# Patient Record
Sex: FEMALE | Race: WHITE | NOT HISPANIC OR LATINO | URBAN - METROPOLITAN AREA
[De-identification: names, ages, dates, MRNs, and addresses within clinical notes are randomized per-mention and may not be internally consistent; named-entity substitution may affect disease eponyms.]

---

## 2017-01-31 ENCOUNTER — COMMUNICATION - HEALTHEAST (OUTPATIENT)
Dept: FAMILY MEDICINE | Facility: CLINIC | Age: 47
End: 2017-01-31

## 2017-06-17 ENCOUNTER — HEALTH MAINTENANCE LETTER (OUTPATIENT)
Age: 47
End: 2017-06-17

## 2018-02-08 ENCOUNTER — HOSPITAL ENCOUNTER (EMERGENCY)
Facility: CLINIC | Age: 48
Discharge: HOME | End: 2018-02-08
Attending: PHYSICIAN ASSISTANT | Admitting: PHYSICIAN ASSISTANT
Payer: COMMERCIAL

## 2018-02-08 VITALS
DIASTOLIC BLOOD PRESSURE: 89 MMHG | HEIGHT: 68 IN | HEART RATE: 85 BPM | RESPIRATION RATE: 16 BRPM | SYSTOLIC BLOOD PRESSURE: 131 MMHG | TEMPERATURE: 97.7 F | OXYGEN SATURATION: 98 %

## 2018-02-08 DIAGNOSIS — M25.511 RIGHT SHOULDER PAIN, UNSPECIFIED CHRONICITY: ICD-10-CM

## 2018-02-08 PROCEDURE — 25000128 H RX IP 250 OP 636: Performed by: PHYSICIAN ASSISTANT

## 2018-02-08 PROCEDURE — 99202 OFFICE O/P NEW SF 15 MIN: CPT | Performed by: PHYSICIAN ASSISTANT

## 2018-02-08 PROCEDURE — 96372 THER/PROPH/DIAG INJ SC/IM: CPT

## 2018-02-08 PROCEDURE — G0463 HOSPITAL OUTPT CLINIC VISIT: HCPCS | Mod: 25

## 2018-02-08 RX ORDER — KETOROLAC TROMETHAMINE 10 MG/1
10 TABLET, FILM COATED ORAL EVERY 6 HOURS PRN
Qty: 20 TABLET | Refills: 0 | Status: SHIPPED | OUTPATIENT
Start: 2018-02-08 | End: 2018-02-13

## 2018-02-08 RX ORDER — KETOROLAC TROMETHAMINE 30 MG/ML
60 INJECTION, SOLUTION INTRAMUSCULAR; INTRAVENOUS ONCE
Status: COMPLETED | OUTPATIENT
Start: 2018-02-08 | End: 2018-02-08

## 2018-02-08 RX ADMIN — KETOROLAC TROMETHAMINE 60 MG: 30 INJECTION, SOLUTION INTRAMUSCULAR at 20:05

## 2018-02-08 NOTE — ED AVS SNAPSHOT
[facility] Emergency Department    [address] [address]    Phone:  [phone]    Fax:  [phone]                                       Sophy Tran   MRN: [id number]    Department:  [facility] Emergency Department   Date of Visit:  2/8/2018           Patient Information     Date Of Birth          1970        Your diagnoses for this visit were:     Right shoulder pain, unspecified chronicity        You were seen by George Badillo PA-C.        Discharge Instructions       Please give [facility] in [place] a call to make an appointment.  [phone]    [facility] [place]  [address] [place] [address]    Discharge References/Attachments     SHOULDER PAIN, UNCERTAIN CAUSE (ENGLISH)      24 Hour Appointment Hotline       To make an appointment at any [facility] clinic, call [phone] ([phone]). If you don't have a family doctor or clinic, we will help you find one. [facility] clinics are conveniently located to serve the needs of you and your family.             Review of your medicines      START taking        Dose / Directions Last dose taken    ketorolac 10 MG tablet   Commonly known as:  TORADOL   Dose:  10 mg   Quantity:  20 tablet        Take 1 tablet (10 mg) by mouth every 6 hours as needed for moderate pain   Refills:  0          Our records show that you are taking the medicines listed below. If these are incorrect, please call your family doctor or clinic.        Dose / Directions Last dose taken    IRON SUPPLEMENT PO        Refills:  0        MULTI VITAMIN PO        Refills:  0        PROPRANOLOL HCL PO        Refills:  0        VITAMIN C PO        Refills:  0        VITAMIN D (CHOLECALCIFEROL) PO        Take by mouth daily   Refills:  0                Prescriptions were sent or printed at these locations (1 Prescription)                   [facility] [place], [place] - [address]   [address] [place]  "[place] [address]    Telephone:  [phone]   Fax:  [phone]   Hours:                  E-Prescribed (1 of 1)         ketorolac (TORADOL) 10 MG tablet                Orders Needing Specimen Collection     None      Pending Results     No orders found from 2018 to 2018.            Pending Culture Results     No orders found from 2018 to 2018.            Pending Results Instructions     If you had any lab results that were not finalized at the time of your Discharge, you can call the ED Lab Result RN at [phone]. You will be contacted by this team for any positive Lab results or changes in treatment. The nurses are available 7 days a week from 10A to 6:30P.  You can leave a message 24 hours per day and they will return your call.        Test Results From Your Hospital Stay               Thank you for choosing [facility]       Thank you for choosing [facility] for your care. Our goal is always to provide you with excellent care. Hearing back from our patients is one way we can continue to improve our services. Please take a few minutes to complete the written survey that you may receive in the mail after you visit with us. Thank you!        [employer] Information     [employer] lets you send messages to your doctor, view your test results, renew your prescriptions, schedule appointments and more. To sign up, go to [email or web address] . Click on \"Log in\" on the left side of the screen, which will take you to the Welcome page. Then click on \"Sign up Now\" on the right side of the page.     You will be asked to enter the access code listed below, as well as some personal information. Please follow the directions to create your username and password.     Your access code is: [email or web address]  Expires: 2018  8:00 PM     Your access code will  in 90 days. If you need help or a new code, please call your [facility] clinic or [phone].        Care EveryWhere ID     This is your Care EveryWhere " ID. This could be used by other organizations to access your [place] medical records  [id number]        Equal Access to Services     OFELIA MARTIN : Vicente Pineda, cheryl richard, gela auguste. So [facility] [phone].    ATENCIÓN: Si habla español, tiene a zambrano disposición servicios gratuitos de asistencia lingüística. Llame al [phone].    We comply with applicable federal civil rights laws and [place] laws. We do not discriminate on the basis of race, color, national origin, age, disability, sex, sexual orientation, or gender identity.            After Visit Summary       This is your record. Keep this with you and show to your community pharmacist(s) and doctor(s) at your next visit.

## 2018-02-08 NOTE — ED AVS SNAPSHOT
[facility] Emergency Department    [address] [address]    Phone:  [phone]    Fax:  [phone]                                       Sophy Tran   MRN: [id number]    Department:  [facility] Emergency Department   Date of Visit:  2/8/2018           After Visit Summary Signature Page     I have received my discharge instructions, and my questions have been answered. I have discussed any challenges I see with this plan with the nurse or doctor.    ..........................................................................................................................................  Patient/Patient Representative Signature      ..........................................................................................................................................  Patient Representative Print Name and Relationship to Patient    ..................................................               ................................................  Date                                            Time    ..........................................................................................................................................  Reviewed by Signature/Title    ...................................................              ..............................................  Date                                                            Time

## 2018-02-09 NOTE — ED NOTES
Patient here for pain in the R shoulder - NKI - worsening over the past 2 days. Patient presents ambualtory to the urgent care.

## 2018-02-09 NOTE — DISCHARGE INSTRUCTIONS
Please give [facility] in [place] a call to make an appointment.  [phone]    [facility] [place]  [address] [place] [address]

## 2018-02-09 NOTE — ED PROVIDER NOTES
"Chief Complaint:    Chief Complaint   Patient presents with     Shoulder Pain       HPI: Sophy Tran is an 47 year old female who presents for evaluation and treatment of R shoulder pain.  Patient has had problems with this shoulder in the past.  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 She did see Dr. Jeffrey Viera roughly 1 year ago.  She had an injection ad this helped for a while.  She started having pain again 2 days ago.  She does not recall any acute injury.  It is worse with movement, and while she drives home.  Ibuprofen does help.  She has not had any numbness or tingling in the R arm.  No chest pain or palpitations.  No dizziness or nausea.  No loss of strength in the R arm.      ROS:  Further problem focused system review was otherwise negative.        Surgical History:  History reviewed. No pertinent surgical history.     Problem List:  There is no problem list on file for this patient.       Allergies:  No Known Allergies     Current Meds:    Current Facility-Administered Medications:      ketorolac (TORADOL) injection 60 mg, 60 mg, Intramuscular, Once, George Badillo PA-C    Current Outpatient Prescriptions:      PROPRANOLOL HCL PO, , Disp: , Rfl:      Multiple Vitamin (MULTI VITAMIN PO), , Disp: , Rfl:      Ascorbic Acid (VITAMIN C PO), , Disp: , Rfl:      VITAMIN D, CHOLECALCIFEROL, PO, Take by mouth daily, Disp: , Rfl:      Ferrous Sulfate (IRON SUPPLEMENT PO), , Disp: , Rfl:      ketorolac (TORADOL) 10 MG tablet, Take 1 tablet (10 mg) by mouth every 6 hours as needed for moderate pain, Disp: 20 tablet, Rfl: 0     PHYSICAL EXAM:     Vital signs noted and reviewed by George Badillo  /89  Pulse 85  Temp 97.7  F (36.5  C) (Oral)  Resp 16  Ht 1.727 m (5' 8\")  SpO2 98%     PEFR:  General appearance: healthy, alert and no distress  Ears: R TM - normal: no effusions, no erythema, and normal landmarks, L TM - normal: no effusions, no erythema, and " normal landmarks  Eyes: R normal, L normal  Nose: normal  Oropharynx: normal  Neck: supple and no adenopathy  Lungs: normal and clear to auscultation  Heart: S1, S2 normal, no murmur, click, rub or gallop, regular rate and rhythm, chest is clear without rales or wheezing  Extremities: R shoulder - No deformity, swelling, or ecchymosis.  Full range of active motion, tender with palpation on the anterior area.   strength 5/5, arm is neurologically intact.         ASSESSMENT:     1. Right shoulder pain, unspecified chronicity           PLAN:     Discussed imaging, and patient declined this at this time.  She was given 60 Mg Toradol IM in clinic today.  No further NSAIDS tonight.  Patient requesting to try Toradol.  Rx for this sent in.  She is not to use it for more than 5 days.  Rx for Physical Therapy at [facility] in [place].  She will do this for 1 month.  If symptoms are not improving, she will follow up with Dr. Viera for further evaluation.  Patient verbalized understanding and agreed with this plan.     George Badillo  2/8/2018, 7:39 PM       George Badillo PA-C  02/08/18 2009

## 2018-02-28 ENCOUNTER — HOSPITAL ENCOUNTER (EMERGENCY)
Facility: CLINIC | Age: 48
Discharge: HOME | End: 2018-02-28
Attending: PHYSICIAN ASSISTANT | Admitting: PHYSICIAN ASSISTANT
Payer: COMMERCIAL

## 2018-02-28 VITALS
SYSTOLIC BLOOD PRESSURE: 159 MMHG | DIASTOLIC BLOOD PRESSURE: 100 MMHG | RESPIRATION RATE: 16 BRPM | TEMPERATURE: 98.1 F | HEART RATE: 88 BPM | OXYGEN SATURATION: 97 % | HEIGHT: 68 IN

## 2018-02-28 DIAGNOSIS — M25.511 ACUTE PAIN OF RIGHT SHOULDER: ICD-10-CM

## 2018-02-28 PROCEDURE — G0463 HOSPITAL OUTPT CLINIC VISIT: HCPCS

## 2018-02-28 PROCEDURE — 99213 OFFICE O/P EST LOW 20 MIN: CPT | Performed by: PHYSICIAN ASSISTANT

## 2018-02-28 RX ORDER — KETOROLAC TROMETHAMINE 10 MG/1
10 TABLET, FILM COATED ORAL EVERY 6 HOURS PRN
Qty: 15 TABLET | Refills: 0 | Status: SHIPPED | OUTPATIENT
Start: 2018-02-28 | End: 2024-04-08

## 2018-02-28 NOTE — ED AVS SNAPSHOT
[facility] Emergency Department    [address] [address]    Phone:  [phone]    Fax:  [phone]                                       Sophy Tran   MRN: [id number]    Department:  [facility] Emergency Department   Date of Visit:  2/28/2018           Patient Information     Date Of Birth          1970        Your diagnoses for this visit were:     Acute pain of right shoulder        You were seen by George Badillo PA-C.        Discharge Instructions       Please follow up with Dr. Viera.    24 Hour Appointment Hotline       To make an appointment at any [facility], call [phone] ([phone]). If you don't have a family doctor or clinic, we will help you find one. [facility] clinics are conveniently located to serve the needs of you and your family.             Review of your medicines      START taking        Dose / Directions Last dose taken    ketorolac 10 MG tablet   Commonly known as:  TORADOL   Dose:  10 mg   Quantity:  15 tablet        Take 1 tablet (10 mg) by mouth every 6 hours as needed for moderate pain For no more than 5 days.   Refills:  0          Our records show that you are taking the medicines listed below. If these are incorrect, please call your family doctor or clinic.        Dose / Directions Last dose taken    IRON SUPPLEMENT PO        Refills:  0        MULTI VITAMIN PO        Refills:  0        PROPRANOLOL HCL PO        Refills:  0        VITAMIN C PO        Refills:  0        VITAMIN D (CHOLECALCIFEROL) PO        Take by mouth daily   Refills:  0                Prescriptions were sent or printed at these locations (1 Prescription)                   [facility] [address]   [address] [address]    Telephone:  [phone]   Fax:  [phone]   Hours:                  E-Prescribed (1 of 1)         ketorolac (TORADOL) 10 MG tablet                Orders Needing Specimen  "Collection     None      Pending Results     No orders found from 2018 to 3/1/2018.            Pending Culture Results     No orders found from 2018 to 3/1/2018.            Pending Results Instructions     If you had any lab results that were not finalized at the time of your Discharge, you can call the ED Lab Result RN at [phone]. You will be contacted by this team for any positive Lab results or changes in treatment. The nurses are available 7 days a week from 10A to 6:30P.  You can leave a message 24 hours per day and they will return your call.        Test Results From Your Hospital Stay               Thank you for choosing [facility]       Thank you for choosing [facility] for your care. Our goal is always to provide you with excellent care. Hearing back from our patients is one way we can continue to improve our services. Please take a few minutes to complete the written survey that you may receive in the mail after you visit with us. Thank you!        [employer] Information     [employer] lets you send messages to your doctor, view your test results, renew your prescriptions, schedule appointments and more. To sign up, go to [email or web address] . Click on \"Log in\" on the left side of the screen, which will take you to the Welcome page. Then click on \"Sign up Now\" on the right side of the page.     You will be asked to enter the access code listed below, as well as some personal information. Please follow the directions to create your username and password.     Your access code is: [email or web address]  Expires: 2018  8:00 PM     Your access code will  in 90 days. If you need help or a new code, please call your [facility] clinic or [phone].        Care EveryWhere ID     This is your Care EveryWhere ID. This could be used by other organizations to access your [facility] medical records  [id number]        Equal Access to Services     OFELIA CORONA: cheryl Carter, " gela auguste ah. So [facility] [phone].    ATENCIÓN: Si habla krissyañol, tiene a zambrano disposición servicios gratuitos de asistencia lingüística. Llame al [phone].    We comply with applicable federal civil rights laws and [place] laws. We do not discriminate on the basis of race, color, national origin, age, disability, sex, sexual orientation, or gender identity.            After Visit Summary       This is your record. Keep this with you and show to your community pharmacist(s) and doctor(s) at your next visit.

## 2018-02-28 NOTE — ED AVS SNAPSHOT
[facility] Emergency Department    [address] [address]    Phone:  [phone]    Fax:  [phone]                                       Sophy Tran   MRN: [id number]    Department:  [facility] Emergency Department   Date of Visit:  2/28/2018           After Visit Summary Signature Page     I have received my discharge instructions, and my questions have been answered. I have discussed any challenges I see with this plan with the nurse or doctor.    ..........................................................................................................................................  Patient/Patient Representative Signature      ..........................................................................................................................................  Patient Representative Print Name and Relationship to Patient    ..................................................               ................................................  Date                                            Time    ..........................................................................................................................................  Reviewed by Signature/Title    ...................................................              ..............................................  Date                                                            Time

## 2018-03-01 NOTE — ED NOTES
Patient here for pain in R arm - ongoing - has not started PT.  Patient presents ambualtory to the urgent care.

## 2018-03-01 NOTE — ED PROVIDER NOTES
"Chief Complaint:    Chief Complaint   Patient presents with     Arm Pain     has been seen for this before, was recommended that she do PT but hasn't done that. thinks she pulled something in her right arm       HPI: Sophy Tran is an 47 year old female who presents for evaluation and treatment of R shoulder pain.  I saw this patient on 2/8 for same.  She was advised to start PT and she states that she has not been able to do this due to her schedule.  She was given Toradol, and she states that this helped and she would like a refill.  Her arm pain has not worsened.  She has not had any weakness in the R arm.  She does get some numbness and tingling that comes and goes.      ROS:  Further problem focused system review was otherwise negative.        Surgical History:  History reviewed. No pertinent surgical history.     Problem List:  There is no problem list on file for this patient.       Allergies:  No Known Allergies     Current Meds:  No current facility-administered medications for this encounter.     Current Outpatient Prescriptions:      ketorolac (TORADOL) 10 MG tablet, Take 1 tablet (10 mg) by mouth every 6 hours as needed for moderate pain For no more than 5 days., Disp: 15 tablet, Rfl: 0     PROPRANOLOL HCL PO, , Disp: , Rfl:      Multiple Vitamin (MULTI VITAMIN PO), , Disp: , Rfl:      Ascorbic Acid (VITAMIN C PO), , Disp: , Rfl:      VITAMIN D, CHOLECALCIFEROL, PO, Take by mouth daily, Disp: , Rfl:      Ferrous Sulfate (IRON SUPPLEMENT PO), , Disp: , Rfl:      PHYSICAL EXAM:     Vital signs noted and reviewed by George Badillo  BP (!) 159/100  Pulse 88  Temp 98.1  F (36.7  C) (Oral)  Resp 16  Ht 1.727 m (5' 8\")  SpO2 97%     PEFR:  General appearance: healthy, alert and no distress  Ears: R TM - normal: no effusions, no erythema, and normal landmarks, L TM - normal: no effusions, no erythema, and normal landmarks  Eyes: R normal, L normal  Nose: normal  Oropharynx: normal  Neck: supple and no " adenopathy  Lungs: normal and clear to auscultation  Heart: S1, S2 normal, no murmur, click, rub or gallop, regular rate and rhythm  Extremities: R shoulder - full range of motion.  Rotator cuff is intact.  Arm is neurologically intact.          ASSESSMENT:     1. Acute pain of right shoulder         PLAN:     Patient is requesting refill of Toradol.  Discussed this at length.  This will be refilled today, with no further refills.  Patient does not feel that she will be able to go to physical therapy.  She is interested in MRI of the shoulder.  Patient would like to follow up with Ortho.  She will see Dr. Viera in [facility] as this is on her way home.  Patient given information for this.  Patient verbalized understanding and agreed with this plan.     George Badillo  2/28/2018, 7:40 PM       George Badillo PA-C  02/28/18 1957

## 2018-06-23 ENCOUNTER — HEALTH MAINTENANCE LETTER (OUTPATIENT)
Age: 48
End: 2018-06-23

## 2019-10-02 ENCOUNTER — HEALTH MAINTENANCE LETTER (OUTPATIENT)
Age: 49
End: 2019-10-02

## 2020-06-25 LAB
HPV SOURCE: NORMAL
HUMAN PAPILLOMA VIRUS 16 DNA: NEGATIVE
HUMAN PAPILLOMA VIRUS 18 DNA: NEGATIVE
HUMAN PAPILLOMA VIRUS FINAL DIAGNOSIS: NORMAL
HUMAN PAPILLOMA VIRUS OTHER HR: NEGATIVE
SPECIMEN DESCRIPTION: NORMAL

## 2020-06-30 ENCOUNTER — RECORDS - HEALTHEAST (OUTPATIENT)
Dept: ADMINISTRATIVE | Facility: OTHER | Age: 50
End: 2020-06-30

## 2020-06-30 LAB
BKR LAB AP ABNORMAL BLEEDING: NO
BKR LAB AP BIRTH CONTROL/HORMONES: NORMAL
BKR LAB AP CERVICAL APPEARANCE: NORMAL
BKR LAB AP GYN ADEQUACY: NORMAL
BKR LAB AP GYN INTERPRETATION: NORMAL
BKR LAB AP HPV REFLEX: NORMAL
BKR LAB AP LMP: NORMAL
BKR LAB AP PATIENT STATUS: NORMAL
BKR LAB AP PREVIOUS ABNORMAL: NORMAL
BKR LAB AP PREVIOUS NORMAL: NORMAL
HIGH RISK?: YES
PATH REPORT.COMMENTS IMP SPEC: NORMAL
RESULT FLAG (HE HISTORICAL CONVERSION): NORMAL

## 2020-07-21 ENCOUNTER — RECORDS - HEALTHEAST (OUTPATIENT)
Dept: LAB | Facility: CLINIC | Age: 50
End: 2020-07-21

## 2020-07-21 LAB — TSH SERPL DL<=0.005 MIU/L-ACNC: 3.01 UIU/ML (ref 0.3–5)

## 2020-09-15 ENCOUNTER — RECORDS - HEALTHEAST (OUTPATIENT)
Dept: LAB | Facility: CLINIC | Age: 50
End: 2020-09-15

## 2020-09-16 LAB
HSV SPECIMEN: NORMAL
HSV1 DNA SPEC QL NAA+PROBE: NEGATIVE
HSV2 DNA SPEC QL NAA+PROBE: NEGATIVE

## 2021-01-15 ENCOUNTER — HEALTH MAINTENANCE LETTER (OUTPATIENT)
Age: 51
End: 2021-01-15

## 2021-02-18 ENCOUNTER — RECORDS - HEALTHEAST (OUTPATIENT)
Dept: LAB | Facility: CLINIC | Age: 51
End: 2021-02-18

## 2021-02-18 LAB
ANION GAP SERPL CALCULATED.3IONS-SCNC: 8 MMOL/L (ref 5–18)
BUN SERPL-MCNC: 13 MG/DL (ref 8–22)
CALCIUM SERPL-MCNC: 10.1 MG/DL (ref 8.5–10.5)
CHLORIDE BLD-SCNC: 97 MMOL/L (ref 98–107)
CO2 SERPL-SCNC: 33 MMOL/L (ref 22–31)
CREAT SERPL-MCNC: 0.76 MG/DL (ref 0.6–1.1)
GFR SERPL CREATININE-BSD FRML MDRD: >60 ML/MIN/1.73M2
GLUCOSE BLD-MCNC: 62 MG/DL (ref 70–125)
POTASSIUM BLD-SCNC: 4.5 MMOL/L (ref 3.5–5)
SODIUM SERPL-SCNC: 138 MMOL/L (ref 136–145)

## 2021-05-24 ENCOUNTER — RECORDS - HEALTHEAST (OUTPATIENT)
Dept: ADMINISTRATIVE | Facility: CLINIC | Age: 51
End: 2021-05-24

## 2021-08-10 ENCOUNTER — LAB REQUISITION (OUTPATIENT)
Dept: LAB | Facility: CLINIC | Age: 51
End: 2021-08-10

## 2021-08-10 DIAGNOSIS — E03.9 HYPOTHYROIDISM, UNSPECIFIED: ICD-10-CM

## 2021-08-10 DIAGNOSIS — I10 ESSENTIAL (PRIMARY) HYPERTENSION: ICD-10-CM

## 2021-08-10 LAB
ANION GAP SERPL CALCULATED.3IONS-SCNC: 8 MMOL/L (ref 5–18)
BUN SERPL-MCNC: 13 MG/DL (ref 8–22)
CALCIUM SERPL-MCNC: 9.8 MG/DL (ref 8.5–10.5)
CHLORIDE BLD-SCNC: 100 MMOL/L (ref 98–107)
CO2 SERPL-SCNC: 29 MMOL/L (ref 22–31)
CREAT SERPL-MCNC: 0.77 MG/DL (ref 0.6–1.1)
GFR SERPL CREATININE-BSD FRML MDRD: 88 ML/MIN/1.73M2
GLUCOSE BLD-MCNC: 102 MG/DL (ref 70–125)
POTASSIUM BLD-SCNC: 4.3 MMOL/L (ref 3.5–5)
SODIUM SERPL-SCNC: 137 MMOL/L (ref 136–145)
TSH SERPL DL<=0.005 MIU/L-ACNC: 2.13 UIU/ML (ref 0.3–5)

## 2021-08-10 PROCEDURE — 84443 ASSAY THYROID STIM HORMONE: CPT | Performed by: FAMILY MEDICINE

## 2021-08-10 PROCEDURE — 82374 ASSAY BLOOD CARBON DIOXIDE: CPT | Performed by: FAMILY MEDICINE

## 2021-09-04 ENCOUNTER — HEALTH MAINTENANCE LETTER (OUTPATIENT)
Age: 51
End: 2021-09-04

## 2022-01-25 ENCOUNTER — LAB REQUISITION (OUTPATIENT)
Dept: LAB | Facility: CLINIC | Age: 52
End: 2022-01-25

## 2022-01-25 DIAGNOSIS — F11.21 OPIOID DEPENDENCE, IN REMISSION (H): ICD-10-CM

## 2022-01-25 PROCEDURE — 80307 DRUG TEST PRSMV CHEM ANLYZR: CPT | Performed by: FAMILY MEDICINE

## 2022-01-26 LAB — METHADONE UR QL SCN: NORMAL

## 2022-02-19 ENCOUNTER — HEALTH MAINTENANCE LETTER (OUTPATIENT)
Age: 52
End: 2022-02-19

## 2022-10-04 ENCOUNTER — LAB REQUISITION (OUTPATIENT)
Dept: LAB | Facility: CLINIC | Age: 52
End: 2022-10-04

## 2022-10-04 DIAGNOSIS — E03.9 HYPOTHYROIDISM, UNSPECIFIED: ICD-10-CM

## 2022-10-04 DIAGNOSIS — I10 ESSENTIAL (PRIMARY) HYPERTENSION: ICD-10-CM

## 2022-10-04 LAB
ANION GAP SERPL CALCULATED.3IONS-SCNC: 11 MMOL/L (ref 7–15)
BUN SERPL-MCNC: 15.6 MG/DL (ref 6–20)
CALCIUM SERPL-MCNC: 9.6 MG/DL (ref 8.6–10)
CHLORIDE SERPL-SCNC: 96 MMOL/L (ref 98–107)
CHOLEST SERPL-MCNC: 214 MG/DL
CREAT SERPL-MCNC: 0.84 MG/DL (ref 0.51–0.95)
DEPRECATED HCO3 PLAS-SCNC: 33 MMOL/L (ref 22–29)
GFR SERPL CREATININE-BSD FRML MDRD: 82 ML/MIN/1.73M2
GLUCOSE SERPL-MCNC: 90 MG/DL (ref 70–99)
HDLC SERPL-MCNC: 49 MG/DL
LDLC SERPL CALC-MCNC: 119 MG/DL
NONHDLC SERPL-MCNC: 165 MG/DL
POTASSIUM SERPL-SCNC: 3.2 MMOL/L (ref 3.4–5.3)
SODIUM SERPL-SCNC: 140 MMOL/L (ref 136–145)
TRIGL SERPL-MCNC: 231 MG/DL
TSH SERPL DL<=0.005 MIU/L-ACNC: 2.53 UIU/ML (ref 0.3–4.2)

## 2022-10-04 PROCEDURE — 80048 BASIC METABOLIC PNL TOTAL CA: CPT | Performed by: FAMILY MEDICINE

## 2022-10-04 PROCEDURE — 84443 ASSAY THYROID STIM HORMONE: CPT | Performed by: FAMILY MEDICINE

## 2022-10-04 PROCEDURE — 80061 LIPID PANEL: CPT | Performed by: FAMILY MEDICINE

## 2022-10-22 ENCOUNTER — HEALTH MAINTENANCE LETTER (OUTPATIENT)
Age: 52
End: 2022-10-22

## 2023-04-01 ENCOUNTER — HEALTH MAINTENANCE LETTER (OUTPATIENT)
Age: 53
End: 2023-04-01

## 2023-09-27 ENCOUNTER — LAB REQUISITION (OUTPATIENT)
Dept: LAB | Facility: CLINIC | Age: 53
End: 2023-09-27

## 2023-09-27 DIAGNOSIS — I10 ESSENTIAL (PRIMARY) HYPERTENSION: ICD-10-CM

## 2023-09-27 DIAGNOSIS — E03.9 HYPOTHYROIDISM, UNSPECIFIED: ICD-10-CM

## 2023-09-27 LAB
ANION GAP SERPL CALCULATED.3IONS-SCNC: 14 MMOL/L (ref 7–15)
BUN SERPL-MCNC: 16.5 MG/DL (ref 6–20)
CALCIUM SERPL-MCNC: 9.5 MG/DL (ref 8.6–10)
CHLORIDE SERPL-SCNC: 97 MMOL/L (ref 98–107)
CHOLEST SERPL-MCNC: 228 MG/DL
CREAT SERPL-MCNC: 0.79 MG/DL (ref 0.51–0.95)
DEPRECATED HCO3 PLAS-SCNC: 27 MMOL/L (ref 22–29)
EGFRCR SERPLBLD CKD-EPI 2021: 88 ML/MIN/1.73M2
GLUCOSE SERPL-MCNC: 97 MG/DL (ref 70–99)
HDLC SERPL-MCNC: 55 MG/DL
LDLC SERPL CALC-MCNC: 128 MG/DL
NONHDLC SERPL-MCNC: 173 MG/DL
POTASSIUM SERPL-SCNC: 4.1 MMOL/L (ref 3.4–5.3)
SODIUM SERPL-SCNC: 138 MMOL/L (ref 135–145)
TRIGL SERPL-MCNC: 224 MG/DL
TSH SERPL DL<=0.005 MIU/L-ACNC: 1.97 UIU/ML (ref 0.3–4.2)

## 2023-09-27 PROCEDURE — 84443 ASSAY THYROID STIM HORMONE: CPT | Performed by: FAMILY MEDICINE

## 2023-09-27 PROCEDURE — 80048 BASIC METABOLIC PNL TOTAL CA: CPT | Performed by: FAMILY MEDICINE

## 2023-09-27 PROCEDURE — 80061 LIPID PANEL: CPT | Performed by: FAMILY MEDICINE

## 2024-04-02 ENCOUNTER — LAB REQUISITION (OUTPATIENT)
Dept: LAB | Facility: CLINIC | Age: 54
End: 2024-04-02

## 2024-04-02 DIAGNOSIS — I10 ESSENTIAL (PRIMARY) HYPERTENSION: ICD-10-CM

## 2024-04-02 PROCEDURE — 80048 BASIC METABOLIC PNL TOTAL CA: CPT | Performed by: FAMILY MEDICINE

## 2024-04-03 LAB
ANION GAP SERPL CALCULATED.3IONS-SCNC: 13 MMOL/L (ref 7–15)
BUN SERPL-MCNC: 18.6 MG/DL (ref 6–20)
CALCIUM SERPL-MCNC: 9.9 MG/DL (ref 8.6–10)
CHLORIDE SERPL-SCNC: 96 MMOL/L (ref 98–107)
CREAT SERPL-MCNC: 0.85 MG/DL (ref 0.51–0.95)
DEPRECATED HCO3 PLAS-SCNC: 28 MMOL/L (ref 22–29)
EGFRCR SERPLBLD CKD-EPI 2021: 80 ML/MIN/1.73M2
GLUCOSE SERPL-MCNC: 101 MG/DL (ref 70–99)
POTASSIUM SERPL-SCNC: 4 MMOL/L (ref 3.4–5.3)
SODIUM SERPL-SCNC: 137 MMOL/L (ref 135–145)

## 2024-04-08 ENCOUNTER — VIRTUAL VISIT (OUTPATIENT)
Dept: PHARMACY | Facility: PHYSICIAN GROUP | Age: 54
End: 2024-04-08
Payer: COMMERCIAL

## 2024-04-08 VITALS
DIASTOLIC BLOOD PRESSURE: 82 MMHG | WEIGHT: 253 LBS | HEIGHT: 68 IN | BODY MASS INDEX: 38.34 KG/M2 | HEART RATE: 68 BPM | SYSTOLIC BLOOD PRESSURE: 122 MMHG

## 2024-04-08 DIAGNOSIS — E66.9 OBESITY WITH SERIOUS COMORBIDITY, UNSPECIFIED CLASSIFICATION, UNSPECIFIED OBESITY TYPE: Primary | ICD-10-CM

## 2024-04-08 DIAGNOSIS — I10 ESSENTIAL HYPERTENSION: ICD-10-CM

## 2024-04-08 DIAGNOSIS — G43.909 MIGRAINE WITHOUT STATUS MIGRAINOSUS, NOT INTRACTABLE, UNSPECIFIED MIGRAINE TYPE: ICD-10-CM

## 2024-04-08 DIAGNOSIS — E03.9 HYPOTHYROIDISM, UNSPECIFIED TYPE: ICD-10-CM

## 2024-04-08 DIAGNOSIS — F11.90 OPIOID USE DISORDER: ICD-10-CM

## 2024-04-08 DIAGNOSIS — F41.9 ANXIETY: ICD-10-CM

## 2024-04-08 DIAGNOSIS — G89.4 CHRONIC PAIN SYNDROME: ICD-10-CM

## 2024-04-08 PROCEDURE — 99207 PR NO CHARGE LOS: CPT | Mod: 93 | Performed by: PHARMACIST

## 2024-04-08 RX ORDER — CYCLOBENZAPRINE HCL 10 MG
10 TABLET ORAL DAILY PRN
COMMUNITY

## 2024-04-08 RX ORDER — BUPRENORPHINE HYDROCHLORIDE AND NALOXONE HYDROCHLORIDE DIHYDRATE 8; 2 MG/1; MG/1
1 TABLET SUBLINGUAL 3 TIMES DAILY
COMMUNITY
End: 2024-07-08

## 2024-04-08 RX ORDER — SUMATRIPTAN 100 MG/1
100 TABLET, FILM COATED ORAL
COMMUNITY
End: 2024-07-08

## 2024-04-08 RX ORDER — AMITRIPTYLINE HYDROCHLORIDE 100 MG/1
100 TABLET ORAL AT BEDTIME
COMMUNITY

## 2024-04-08 RX ORDER — PROPRANOLOL HYDROCHLORIDE 60 MG/1
60 TABLET ORAL DAILY
COMMUNITY

## 2024-04-08 RX ORDER — CHLORTHALIDONE 25 MG/1
25 TABLET ORAL DAILY
COMMUNITY

## 2024-04-08 RX ORDER — CLONAZEPAM 0.5 MG/1
0.5 TABLET ORAL 3 TIMES DAILY PRN
COMMUNITY

## 2024-04-08 RX ORDER — RISPERIDONE 2 MG/1
2 TABLET ORAL DAILY
COMMUNITY

## 2024-04-08 RX ORDER — VENLAFAXINE 75 MG/1
225 TABLET ORAL DAILY
COMMUNITY

## 2024-04-08 RX ORDER — LEVOTHYROXINE SODIUM 50 UG/1
50 TABLET ORAL DAILY
COMMUNITY

## 2024-04-08 NOTE — PROGRESS NOTES
Medication Therapy Management (MTM) Encounter    ASSESSMENT:                            Medication Adherence/Access: No issues identified  .  Obesity: would benefit from starting a GLP1 agonist to help with weight loss.    .  Anxiety:  It is unclear the indication for risperidone - may benefit from trying to taper off this medication in the future.  Venlafaxine is also at the highest dose and she's still having a lot of anxiety.  Would benefit form pharmacogenetic testing.  Would hold off on any medication changes for now due to plan to taper off Suboxone (concern for worsening anxiety).  .  Chronic pain/Opioid Use Disorder:  plan in place to taper down the Suboxone.  .  Migraine: Stable.  .  Hypertension: Stable. Patient is meeting blood pressure goal of < 140/90mmHg.  .  Hypothyroidism: Stable.  .    PLAN:                            Will start compounded Semaglutide subcutaneous injection.  This prescription will be sent to [facility].  - Month 1: Start Semaglutide 0.25 mg once weekly for 4 weeks, then increase to 0.5 mg weekly  Month 2: Increase to Semaglutide 0.5 mg weekly.    2.  Recommend we do the genetic test called the [employer] test.  It may be beneficial to change one of your anxiety medications, but this test may be helpful.    3  I sent in a refill of Narcan nasal spray since your bottle is .    Follow-up: Return in about 4 weeks (around 2024) for MTM Follow Up.    SUBJECTIVE/OBJECTIVE:                          Sophy Tran is a 53 year old female called for an initial visit. She was referred to me from Dr. Murillo.      Reason for visit: initial medication review, weight loss medications.    Allergies/ADRs: Reviewed in chart  Past Medical History: Reviewed in chart  Tobacco: She reports that she has quit smoking. Her smoking use included cigarettes. She smoked an average of 0.5 packs per day. She has never used smokeless tobacco.  Alcohol: none    Medication  Adherence/Access: no issues reported    Obesity:   They recently tried starting Trulicity but it's not covered for weight loss indication. Hasn't tried other medications in the in past.  She is requesting a prescription for compounded sublingual semaglutide from [facility].  They have a 500 mcg, 1000 mcg, and 1500 mcg tablet.  We dicussed that semaglutide has never been studied to be given sublingually, and there are not trials that show it's even absorbed this route.  I would not recommend any sublingual compounded semaglutide.  The only semaglutide that can legally be compounded would be subcutaneous injections made with the pure semaglutide powder.  [facility] pharmacy cannot make that because it must be made under sterile conditions.  [facility] is making this right now while [facility] is on shortage.  No personal or family history of thyroid carcinoma.  No history of pancreatitis, MTC, MEN2, or gallbladder issues.  Reviewed possible side effects of lower appetite, nausea, bowel movement changes and vomiting.    Anxiety:  - Amitriptyline 100 mg once daily at bedtime  - Venlafaxine 225 mg daily  - Risperidone 2 mg at bedtime  - Clonazepam 0.5 mg 1-3 times daily as needed, uses 1-2 times   She said her anxiety is always terrible.  Has a hard time talking to others, answering the phone.  She isn't really sure if the venlafaxine helps.  She feels like the clonazepam helps her the most, allows her to function better.  Notices dry mouth from meds, and possibly weight gain (amitriptyline, risperidone).  She isn't sure how much the venlafaxine or risperidone are helping.  She said she's tried may other medications in the past, used to work with psychiatry.  Doesn't remember all of the meds she tried, but didn't think they were very effective.  She doesn't think she ever had pharmacogenetic testing done, but is very interested in this given the multiple meds she tried.  Discussed the typically cost  and that would manageable for her.  Past meds:  Sertraline  escitalopram    Chronic pain/Opioid Use Disorder:    - Cyclobenzaprine 10 mg daily - uses 3 times a week  - Buprenorhine/Naloxone 8-2 mg three times daily - they will be tapering off this by 2 mg each month  - Narcan nasal spray   They are working to wean down on the Suboxone. Has been on it for about 4-5 years.  Patient reports the following side effects:  none.    Migraine:   - Sumatriptan 100 mg as needed for migraine, uses very rarely  Hasn't had migraines recently.  Amitriptyline and propranolol are also helping with migraines.    Hypertension:   - Chlorthalidone 25 mg daily  - Propranolol 60 mg daily, also migraine prophylaxis  Patient reports no current medication side effects.  Patient does not self-monitor blood pressure.      Hypothyroidism:   - Levothyroxine 50 mcg daily  Patient is having the following symptoms: none.   TSH   Date Value Ref Range Status   09/27/2023 1.97 0.30 - 4.20 uIU/mL Final   08/10/2021 2.13 0.30 - 5.00 uIU/mL Final     ----------------      I spent 45 minutes with this patient today. All changes were made via collaborative practice agreement with Callum Murillo MD, MD. A copy of the visit note was provided to the patient's provider(s).    A summary of these recommendations was sent via clinic portal.    Bety Carrillo PharmD  Medication Therapy Management Pharmacist      Telemedicine Visit Details  Type of service:  Telephone visit  Start Time:  3 pm  End Time:  3:45 pm     Medication Therapy Recommendations  No medication therapy recommendations to display

## 2024-05-04 NOTE — PROGRESS NOTES
Medication Therapy Management (MTM) Encounter    ASSESSMENT:                            Medication Adherence/Access: No issues identified  .  Obesity: stable, doing well on semaglutide.    PLAN:                            Continue plan to increase Semaglutide to 1 mg next month If tolerating    Follow-up: Return in about 4 weeks (around 6/3/2024) for MTM Follow Up.    SUBJECTIVE/OBJECTIVE:                          Sophy Tran is a 54 year old female called for a follow up    Reason for visit: medication review, weight loss medications.    Allergies/ADRs: Reviewed in chart  Past Medical History: Reviewed in chart  Tobacco: She reports that she has quit smoking. Her smoking use included cigarettes. She has never used smokeless tobacco.  Alcohol: none    Medication Adherence/Access: no issues reported    Obesity:   - Compounded Semaglutide 0.5 mg weekly, started higher dose 1 week ago  She's feeling great on the Semaglutide!  No side effects or issues.  Notices her appetite is much lower, no cravings for sweets.  Lost 5lbs so far.  She would like to try going up to the 1 mg dose after she completes the 0.5 mg dose pens.  The cost has been manageable, getting from [facility]  No personal or family history of thyroid carcinoma.  No history of pancreatitis, MTC, MEN2, or gallbladder issues.  Reviewed possible side effects of lower appetite, nausea, bowel movement changes and vomiting.    ----------------      I spent 10 minutes with this patient today. All changes were made via collaborative practice agreement with Callum Murillo MD, MD. A copy of the visit note was provided to the patient's provider(s).    A summary of these recommendations was sent via clinic portal.    Bety Carrillo PharmD  Medication Therapy Management Pharmacist      Telemedicine Visit Details  Type of service:  Telephone visit  Start Time: 10:30 am   End Time: 10:40 AM      Medication Therapy Recommendations  Obesity with  serious comorbidity, unspecified classification, unspecified obesity type    Rationale: Untreated condition - Needs additional medication therapy - Indication   Recommendation: Start Medication   Status: Accepted per CPA   Note: semaglutide

## 2024-05-06 ENCOUNTER — VIRTUAL VISIT (OUTPATIENT)
Dept: PHARMACY | Facility: PHYSICIAN GROUP | Age: 54
End: 2024-05-06

## 2024-05-06 DIAGNOSIS — E66.9 OBESITY WITH SERIOUS COMORBIDITY, UNSPECIFIED CLASSIFICATION, UNSPECIFIED OBESITY TYPE: Primary | ICD-10-CM

## 2024-05-06 PROCEDURE — 99207 PR NO CHARGE LOS: CPT | Mod: 93 | Performed by: PHARMACIST

## 2024-06-02 ENCOUNTER — HEALTH MAINTENANCE LETTER (OUTPATIENT)
Age: 54
End: 2024-06-02

## 2024-06-03 ENCOUNTER — VIRTUAL VISIT (OUTPATIENT)
Dept: PHARMACY | Facility: PHYSICIAN GROUP | Age: 54
End: 2024-06-03
Payer: COMMERCIAL

## 2024-06-03 DIAGNOSIS — E66.9 OBESITY WITH SERIOUS COMORBIDITY, UNSPECIFIED CLASSIFICATION, UNSPECIFIED OBESITY TYPE: Primary | ICD-10-CM

## 2024-06-03 PROCEDURE — 99207 PR NO CHARGE LOS: CPT | Mod: 93 | Performed by: PHARMACIST

## 2024-06-03 NOTE — PROGRESS NOTES
Medication Therapy Management (MTM) Encounter    ASSESSMENT:                            Medication Adherence/Access: No issues identified  .  Obesity: Doing well on semaglutide.  We can continue to increase the dose each month until seeing goal weight loss of 1-2 lb/week.    PLAN:                            Continue plan to increase Semaglutide to 1.5 mg next month If tolerating    Follow-up:     Jul 08, 2024 10:00 AM  Pharmacist Visit with Bety Carrillo RPH        SUBJECTIVE/OBJECTIVE:                          Sophy Tran is a 54 year old female called for a follow up    Reason for visit: medication review, weight loss medications.    Allergies/ADRs: Reviewed in chart  Past Medical History: Reviewed in chart  Tobacco: She reports that she has quit smoking. Her smoking use included cigarettes. She has never used smokeless tobacco.  Alcohol: none    Medication Adherence/Access: no issues reported    Obesity:   - Compounded Semaglutide 1 mg weekly, started higher dose 1 week ago  She's feeling great on the Semaglutide!  No side effects or issues.  Notices her appetite is much lower, no cravings for sweets.  Lost 5lbs so far.  She would like to try going up to the next dose up.  Since the initial 5 lbs she hasn't noticed much weight loss since then.  She just started the 1 mg pen but would like to keep going up on the dose each month.  The cost has been manageable, getting from [facility]  No personal or family history of thyroid carcinoma.  No history of pancreatitis, MTC, MEN2, or gallbladder issues.  Reviewed possible side effects of lower appetite, nausea, bowel movement changes and vomiting.      ----------------      I spent 10 minutes with this patient today. All changes were made via collaborative practice agreement with Callum Murillo MD, MD. A copy of the visit note was provided to the patient's provider(s).    A summary of these recommendations was sent via clinic portal.    Bety  Emiliano Carrillo  Medication Therapy Management Pharmacist      Telemedicine Visit Details  Type of service:  Telephone visit  Start Time: 10:30 am   End Time: 10:40 AM      Medication Therapy Recommendations  Obesity with serious comorbidity, unspecified classification, unspecified obesity type    Current Medication: SEMAGLUTIDE-WEIGHT MANAGEMENT SC   Rationale: Dose too low - Dosage too low - Effectiveness   Recommendation: Increase Dose   Status: Accepted per CPA

## 2024-07-03 NOTE — PROGRESS NOTES
Medication Therapy Management (MTM) Encounter    ASSESSMENT:                            Medication Adherence/Access: No issues identified    Obesity: Doing well on semaglutide.  We can continue to increase the dose each month until seeing goal weight loss of 1-2 lb/week.    Migraine/Tension headaches/ Depression/Anxiety:  Topiramate doesn't have great evidence for tension headaches, and her migraines are well controlled.  May benefit from using her cyclobenzaprine at night if having a lot of tension headaches. Also discussed trying physical therapy and stretches for her neck and upper back.    PLAN:                            Continue plan to titrate the Semaglutide:  - Increase to Semaglutide 2 mg once weekly for 4 weeks, then increase to 2.5 mg weekly thereafter    2.  You could try going some physical therapy exercises and stretches for your neck and upper back to see if they help with your tension headaches.  Here a couple links below you could look at:  -[email or web address]-[email or web address]-[email or web address]  - https://www.P[employer].com/watch?v=LK2v3QHpacZ    Follow-up:  Oct 07, 2024 1:00 PM  MTM New with Fozia Paige RPH      SUBJECTIVE/OBJECTIVE:                          Sophy Tran is a 54 year old female called for a follow up    Reason for visit: medication review, weight loss medications.    Allergies/ADRs: Reviewed in chart  Past Medical History: Reviewed in chart  Tobacco: She reports that she has quit smoking. Her smoking use included cigarettes. She has never used smokeless tobacco.  Alcohol: none    Medication Adherence/Access: no issues reported    Obesity:   - Compounded Semaglutide 1.5 mg weekly  She's lost 13 lbs so far.  She's feeling great on the Semaglutide.  No side effects or issues.  She is still hungry a lot, but it does help her feel full   Notices her appetite is much lower, no cravings for sweets.    The cost has been manageable, getting from [facility].    No personal or family history of thyroid carcinoma.  No history of pancreatitis, MTC, MEN2, or gallbladder issues.  Reviewed possible side effects of lower appetite, nausea, bowel movement changes and vomiting.    Migraine/Tension headaches/ Depression/Anxiety:  - propranolol 60 mg daily  - Venlafaxine 75 mg 3 tablets daily (also takes for anxiety)  - Amitriptyline 100 mg daily  - Risperidone 2 mg daily bedtime  -Clonazepam 0.5 mg three times daily as needed   - Cyclobenzaprine 10 mg daily as needed - uses sometimes for back pain.  Gets a lot of headaches, occur all the time. The headaches are in the back of her head above neck.    She was wondering if topiramate would help with weight loss, and mentioned she heard it can also help with headaches.  Doesn't get migraines as often, the propranolol help a lot for her migraines.  ----------------      I spent 19 minutes with this patient today. All changes were made via collaborative practice agreement with Callum Murillo MD, MD. A copy of the visit note was provided to the patient's provider(s).    A summary of these recommendations was sent via clinic portal.    Bety Carrillo PharmD  Medication Therapy Management Pharmacist    Telemedicine Visit Details  Type of service:  Telephone visit  Start Time: 10:06 am   End Time: 10:25  AM      Medication Therapy Recommendations  Obesity with serious comorbidity, unspecified classification, unspecified obesity type    Current Medication: SEMAGLUTIDE-WEIGHT MANAGEMENT SC   Rationale: Dose too low - Dosage too low - Effectiveness   Recommendation: Increase Dose   Status: Accepted per CPA

## 2024-07-08 ENCOUNTER — VIRTUAL VISIT (OUTPATIENT)
Dept: PHARMACY | Facility: PHYSICIAN GROUP | Age: 54
End: 2024-07-08
Payer: COMMERCIAL

## 2024-07-08 DIAGNOSIS — G44.209 TENSION HEADACHE: ICD-10-CM

## 2024-07-08 DIAGNOSIS — G43.909 MIGRAINE WITHOUT STATUS MIGRAINOSUS, NOT INTRACTABLE, UNSPECIFIED MIGRAINE TYPE: ICD-10-CM

## 2024-07-08 DIAGNOSIS — E66.9 OBESITY WITH SERIOUS COMORBIDITY, UNSPECIFIED CLASSIFICATION, UNSPECIFIED OBESITY TYPE: Primary | ICD-10-CM

## 2024-07-08 DIAGNOSIS — F32.A DEPRESSION, UNSPECIFIED DEPRESSION TYPE: ICD-10-CM

## 2024-07-08 DIAGNOSIS — F41.9 ANXIETY: ICD-10-CM

## 2024-07-08 PROCEDURE — 99207 PR NO CHARGE LOS: CPT | Mod: 93 | Performed by: PHARMACIST

## 2024-10-07 ENCOUNTER — VIRTUAL VISIT (OUTPATIENT)
Dept: PHARMACY | Facility: PHYSICIAN GROUP | Age: 54
End: 2024-10-07
Payer: COMMERCIAL

## 2024-10-07 DIAGNOSIS — G44.209 TENSION HEADACHE: ICD-10-CM

## 2024-10-07 DIAGNOSIS — I10 ESSENTIAL HYPERTENSION: ICD-10-CM

## 2024-10-07 DIAGNOSIS — F41.9 ANXIETY: ICD-10-CM

## 2024-10-07 DIAGNOSIS — G43.909 MIGRAINE WITHOUT STATUS MIGRAINOSUS, NOT INTRACTABLE, UNSPECIFIED MIGRAINE TYPE: ICD-10-CM

## 2024-10-07 DIAGNOSIS — E66.812 CLASS 2 OBESITY WITHOUT SERIOUS COMORBIDITY WITH BODY MASS INDEX (BMI) OF 35.0 TO 35.9 IN ADULT, UNSPECIFIED OBESITY TYPE: Primary | ICD-10-CM

## 2024-10-07 DIAGNOSIS — E03.9 HYPOTHYROIDISM, UNSPECIFIED TYPE: ICD-10-CM

## 2024-10-07 DIAGNOSIS — F32.A DEPRESSION, UNSPECIFIED DEPRESSION TYPE: ICD-10-CM

## 2024-10-07 PROCEDURE — 99207 PR NO CHARGE LOS: CPT | Mod: 93 | Performed by: PHARMACIST

## 2024-10-07 NOTE — PROGRESS NOTES
Medication Therapy Management (MTM) Encounter    ASSESSMENT:                            Medication Adherence/Access: No issues identified    Obesity: Doing well on semaglutide.  We can continue this dose too seeing goal weight loss of 1-2 lb/week. Encouraged patient to set alarm to begin to complement weight loss with additional activity in her routine     Motivational Interviewing  Target Behavior: diet/weight loss  Stage of Change: ACTION (Actively working towards change)  MI Intervention: Supported Autonomy, Collaboration, Evocation and Change talk (evoked)   Change Talk Expressed by the Patient: Desire to change Ability to change Reasons to change Committment to change  Provider Response to Change Talk: E - Evoked more info from patient about behavior change, A - Affirmed patient's thoughts, decisions, or attempts at behavior change, and R - Reflected patient's change talk    Migraine/Tension headaches/ Depression/Anxiety:  stable    Hypertension:  Stable- at goal <130/80 at last visit     Hypothyroidism  stable    PLAN:                            Continue Semaglutide 2 mg once weekly   Set an alarm to remind you to walk on the treadmill / lift weights     Follow-up:  Appointments in Next Year      Jan 13, 2025 1:00 PM  [facility] New with Fozia Paige, [facility] [phone]       SUBJECTIVE/OBJECTIVE:                          Sophy Tran is a 54 year old female called for a follow up    Reason for visit: weight loss medications.    Allergies/ADRs: Reviewed in chart  Past Medical History: Reviewed in chart  Tobacco: She reports that she has quit smoking. Her smoking use included cigarettes. She has never used smokeless tobacco.  Alcohol: none    Medication Adherence/Access: no issues reported    Weight Management   - Compounded Semaglutide 2.5 mg weekly   No side effects or issues.  Notices her appetite is much lower, no cravings  for sweets, not feeling hungry much at all.   Patient reports no current medication side effects.  Nutrition/Eating Habits: yogurt and protein drink for most meals- eating 1-2 times per day   Exercise/Activity: went out to buy a bench press system -does want to add weights/treadmill to her routine, just needs that kick start  Has time in her day from 5-7 PM to do this  Goal weight is to lose 50 more pounds   She has lost 20 pounds so far        Migraine/Tension headaches/ Depression/Anxiety:  - propranolol 60 mg daily  - Venlafaxine 75 mg 3 tablets daily (also takes for anxiety)  - Amitriptyline 100 mg daily  - Risperidone 2 mg daily bedtime  -Clonazepam 0.5 mg three times daily as needed   - Cyclobenzaprine 10 mg daily as needed - uses sometimes for back pain.  Is still getting headaches but thinks they are posture related.   Doesn't get migraines as often, the propranolol help a lot for her migraines.    Hypertension   -chlorthalidone 25 mg daily   Patient reports no current medication side effects  Patient does not self-monitor blood pressure.         Hypothyroidism   -Levothyroxine 50 mcg daily.   Patient is having the following symptoms: none.        ----------------      I spent 12 minutes with this patient today. All changes were made via collaborative practice agreement with Callum Murillo MD, MD. A copy of the visit note was provided to the patient's provider(s).    A summary of these recommendations was sent via clinic portal.    Fozia Paige, Pharm.D.  Medication Therapy Management Pharmacist      Telemedicine Visit Details  The patient's medications can be safely assessed via a telemedicine encounter.  Type of service:  Telephone visit  Originating Location (pt. Location): Home  Distant Location (provider location):  Off-site  Start Time: 1:02 PM  End Time: 1:14 PM       Medication Therapy Recommendations  No medication therapy recommendations to display

## 2024-10-08 ENCOUNTER — LAB REQUISITION (OUTPATIENT)
Dept: LAB | Facility: CLINIC | Age: 54
End: 2024-10-08

## 2024-10-08 DIAGNOSIS — E03.9 HYPOTHYROIDISM, UNSPECIFIED: ICD-10-CM

## 2024-10-08 DIAGNOSIS — I10 ESSENTIAL (PRIMARY) HYPERTENSION: ICD-10-CM

## 2024-10-08 LAB
ANION GAP SERPL CALCULATED.3IONS-SCNC: 11 MMOL/L (ref 7–15)
BUN SERPL-MCNC: 12.1 MG/DL (ref 6–20)
CALCIUM SERPL-MCNC: 8.9 MG/DL (ref 8.8–10.4)
CHLORIDE SERPL-SCNC: 102 MMOL/L (ref 98–107)
CHOLEST SERPL-MCNC: 154 MG/DL
CREAT SERPL-MCNC: 0.75 MG/DL (ref 0.51–0.95)
EGFRCR SERPLBLD CKD-EPI 2021: >90 ML/MIN/1.73M2
FASTING STATUS PATIENT QL REPORTED: ABNORMAL
FASTING STATUS PATIENT QL REPORTED: ABNORMAL
GLUCOSE SERPL-MCNC: 50 MG/DL (ref 70–99)
HCO3 SERPL-SCNC: 26 MMOL/L (ref 22–29)
HDLC SERPL-MCNC: 40 MG/DL
LDLC SERPL CALC-MCNC: 84 MG/DL
NONHDLC SERPL-MCNC: 114 MG/DL
POTASSIUM SERPL-SCNC: 4.7 MMOL/L (ref 3.4–5.3)
SODIUM SERPL-SCNC: 139 MMOL/L (ref 135–145)
TRIGL SERPL-MCNC: 149 MG/DL
TSH SERPL DL<=0.005 MIU/L-ACNC: 1.29 UIU/ML (ref 0.3–4.2)

## 2024-10-08 PROCEDURE — 84443 ASSAY THYROID STIM HORMONE: CPT | Performed by: FAMILY MEDICINE

## 2024-10-08 PROCEDURE — 80061 LIPID PANEL: CPT | Performed by: FAMILY MEDICINE

## 2024-10-08 PROCEDURE — 80048 BASIC METABOLIC PNL TOTAL CA: CPT | Performed by: FAMILY MEDICINE

## 2025-01-13 ENCOUNTER — VIRTUAL VISIT (OUTPATIENT)
Dept: PHARMACY | Facility: PHYSICIAN GROUP | Age: 55
End: 2025-01-13

## 2025-01-13 DIAGNOSIS — G43.909 MIGRAINE WITHOUT STATUS MIGRAINOSUS, NOT INTRACTABLE, UNSPECIFIED MIGRAINE TYPE: ICD-10-CM

## 2025-01-13 DIAGNOSIS — F41.9 ANXIETY: ICD-10-CM

## 2025-01-13 DIAGNOSIS — E66.812 CLASS 2 OBESITY WITHOUT SERIOUS COMORBIDITY WITH BODY MASS INDEX (BMI) OF 35.0 TO 35.9 IN ADULT, UNSPECIFIED OBESITY TYPE: Primary | ICD-10-CM

## 2025-01-13 DIAGNOSIS — G44.209 TENSION HEADACHE: ICD-10-CM

## 2025-01-13 DIAGNOSIS — F32.A DEPRESSION, UNSPECIFIED DEPRESSION TYPE: ICD-10-CM

## 2025-01-13 DIAGNOSIS — I10 ESSENTIAL HYPERTENSION: ICD-10-CM

## 2025-01-13 PROCEDURE — 99207 PR NO CHARGE LOS: CPT | Mod: 93 | Performed by: PHARMACIST

## 2025-01-13 NOTE — PROGRESS NOTES
Medication Therapy Management (MTM) Encounter    ASSESSMENT:                            Medication Adherence/Access: No issues identified    Weight Management  would benefit from continuing compounded semaglutide.  Congratulated progress, encouraged continued work on diet, adding activity as tolerated.  Encouraged meals with fruits, vegetables and protein to ensure patient is getting adequate nutrition with reduced appetite    Migraine/Tension headaches/ Depression/Anxiety:  stable    Hypertension:  Stable- at goal <130/80 at last visit     Hypothyroidism  stable    PLAN:                            To help with tolerability and effectiveness of semaglutide:  -Eat 3 meals with protein focus daily to help with nausea. If you forget to eat, you may feel nausea as a hunger cue.  -Focus on getting protein in first with each meal and snack.   A good starting goal is 60 g protein daily (track this, especially if at weight loss plateau). Once you consistently are getting 60g daily, try getting 90 g protein daily.  -Drink plenty of water - goal 64 oz throughout the day  -You may try Metamucil, Benefiber, or Citrucel to help feel more full (less nausea) and have softer, more consistent bowel movements.  -To optimize weight management - work on incorporating resistance training/weight lifting to build muscle and improve overall metabolism of adipose tissue.    Keep up the good work! Continue current medications     Follow-up: 6 months      SUBJECTIVE/OBJECTIVE:                          Sophy Tran is a 54 year old female called for a follow up    Reason for visit: weight loss medications.    Allergies/ADRs: Reviewed in chart  Past Medical History: Reviewed in chart  Tobacco: She reports that she has quit smoking. Her smoking use included cigarettes. She has never used smokeless tobacco.  Alcohol: none    Medication Adherence/Access: no issues reported    Weight Management   - Compounded Semaglutide 2.5 mg weekly   No side  effects or issues.  Notices her appetite is much lower, no cravings for sweets, not feeling hungry much at all.   Patient reports no current medication side effects.  Nutrition/Eating Habits: yogurt and protein drink for most meals- eating 1-2 times per day   About 1000 calories  1 packet oatmeal  2 protein drinks- 28 grams each   Maybe yogurt   4-5 water bottles a day   Exercise/Activity: is increasing her treadmill, does weight lifting   Has time in her day from 5-7 PM to do this  Goal weight is to lose 50 more pounds   She has lost 20 pounds so far        Migraine/Tension headaches/ Depression/Anxiety:  - propranolol 60 mg daily  - Venlafaxine 75 mg 3 tablets daily  - Amitriptyline 100 mg daily  - Risperidone 2 mg daily bedtime  -Clonazepam 0.5 mg three times daily as needed   - Cyclobenzaprine 10 mg daily as needed - uses sometimes for back pain.  Is still getting headaches but thinks they are posture related. Used to this her whole life    Hypertension   -chlorthalidone 25 mg daily   -potassium ER 10mEq daily   Patient reports no current medication side effects  Patient does not self-monitor blood pressure.         Hypothyroidism   -Levothyroxine 50 mcg daily.   Patient is having the following symptoms: none.        ----------------      I spent 9 minutes with this patient today. All changes were made via collaborative practice agreement with Callum Murillo MD, MD. A copy of the visit note was provided to the patient's provider(s).    A summary of these recommendations was sent via clinic portal.    Fozia Paige Pharm.D.  Medication Therapy Management Pharmacist      Telemedicine Visit Details  The patient's medications can be safely assessed via a telemedicine encounter.  Type of service:  Telephone visit  Originating Location (pt. Location): Home  Distant Location (provider location):  Off-site  Start Time: 1:02 PM  End Time: 1:11 PM       Medication Therapy Recommendations  No medication therapy  recommendations to display